# Patient Record
Sex: FEMALE | Race: BLACK OR AFRICAN AMERICAN | NOT HISPANIC OR LATINO | Employment: FULL TIME | URBAN - METROPOLITAN AREA
[De-identification: names, ages, dates, MRNs, and addresses within clinical notes are randomized per-mention and may not be internally consistent; named-entity substitution may affect disease eponyms.]

---

## 2023-04-27 DIAGNOSIS — K29.70 GASTRITIS, UNSPECIFIED, WITHOUT BLEEDING: ICD-10-CM

## 2023-04-28 RX ORDER — OMEPRAZOLE 20 MG/1
CAPSULE, DELAYED RELEASE ORAL
Qty: 90 CAPSULE | Refills: 1 | Status: SHIPPED | OUTPATIENT
Start: 2023-04-28 | End: 2024-01-12 | Stop reason: ENTERED-IN-ERROR

## 2023-07-07 ENCOUNTER — OFFICE VISIT (OUTPATIENT)
Dept: PRIMARY CARE | Facility: CLINIC | Age: 43
End: 2023-07-07
Payer: COMMERCIAL

## 2023-07-07 VITALS
DIASTOLIC BLOOD PRESSURE: 70 MMHG | WEIGHT: 158.6 LBS | BODY MASS INDEX: 29.01 KG/M2 | SYSTOLIC BLOOD PRESSURE: 120 MMHG | HEART RATE: 59 BPM | OXYGEN SATURATION: 100 %

## 2023-07-07 DIAGNOSIS — E78.5 HYPERLIPIDEMIA, UNSPECIFIED HYPERLIPIDEMIA TYPE: ICD-10-CM

## 2023-07-07 DIAGNOSIS — F41.1 GENERALIZED ANXIETY DISORDER: Primary | ICD-10-CM

## 2023-07-07 DIAGNOSIS — Z13.1 SCREENING FOR DIABETES MELLITUS: ICD-10-CM

## 2023-07-07 PROCEDURE — 99213 OFFICE O/P EST LOW 20 MIN: CPT | Performed by: INTERNAL MEDICINE

## 2023-07-07 PROCEDURE — 1036F TOBACCO NON-USER: CPT | Performed by: INTERNAL MEDICINE

## 2023-07-07 RX ORDER — MULTIVITAMIN
1 TABLET ORAL DAILY
COMMUNITY

## 2023-07-07 RX ORDER — ATORVASTATIN CALCIUM 40 MG/1
40 TABLET, FILM COATED ORAL DAILY
COMMUNITY
End: 2023-07-07 | Stop reason: SDUPTHER

## 2023-07-07 RX ORDER — FERROUS SULFATE 325(65) MG
325 TABLET ORAL
COMMUNITY
Start: 2018-04-09

## 2023-07-07 RX ORDER — ATORVASTATIN CALCIUM 40 MG/1
40 TABLET, FILM COATED ORAL DAILY
Qty: 90 TABLET | Refills: 2 | Status: SHIPPED | OUTPATIENT
Start: 2023-07-07 | End: 2024-01-12

## 2023-07-07 RX ORDER — BUSPIRONE HYDROCHLORIDE 10 MG/1
10 TABLET ORAL 2 TIMES DAILY
COMMUNITY
Start: 2021-11-02 | End: 2023-07-07 | Stop reason: SDUPTHER

## 2023-07-07 RX ORDER — ASCORBIC ACID 500 MG
500 TABLET,CHEWABLE ORAL DAILY
COMMUNITY

## 2023-07-07 RX ORDER — BUSPIRONE HYDROCHLORIDE 10 MG/1
10 TABLET ORAL 3 TIMES DAILY
Qty: 270 TABLET | Refills: 1 | Status: SHIPPED | OUTPATIENT
Start: 2023-07-07 | End: 2024-01-24 | Stop reason: SDUPTHER

## 2023-07-07 ASSESSMENT — ENCOUNTER SYMPTOMS
MUSCLE TENSION: 0
PALPITATIONS: 0
NERVOUS/ANXIOUS: 1
SHORTNESS OF BREATH: 0
PANIC: 1
NAUSEA: 0

## 2023-07-07 ASSESSMENT — PATIENT HEALTH QUESTIONNAIRE - PHQ9
SUM OF ALL RESPONSES TO PHQ9 QUESTIONS 1 AND 2: 0
2. FEELING DOWN, DEPRESSED OR HOPELESS: NOT AT ALL
1. LITTLE INTEREST OR PLEASURE IN DOING THINGS: NOT AT ALL

## 2023-07-07 NOTE — PROGRESS NOTES
Patient ID: Dennys Sahu is a 42 y.o. female who presents for Forms/questionnaires (Fmla forms) and Med Refill.    /70   Pulse 59   Wt 71.9 kg (158 lb 9.6 oz)   LMP  (LMP Unknown)   SpO2 100%   BMI 29.01 kg/m²     Med Refill  Pertinent negatives include no nausea.   Anxiety  Presents for follow-up visit. Symptoms include excessive worry, nervous/anxious behavior and panic. Patient reports no muscle tension, nausea, palpitations or shortness of breath. The quality of sleep is poor. Nighttime awakenings: several.     Compliance with medications is 51-75%.       Subjective     Review of Systems   Respiratory:  Negative for shortness of breath.    Cardiovascular:  Negative for palpitations.   Gastrointestinal:  Negative for nausea.   Psychiatric/Behavioral:  The patient is nervous/anxious.        Objective     Physical Exam  Vitals and nursing note reviewed.   Neck:      Vascular: No carotid bruit.   Musculoskeletal:      Right lower leg: No edema.      Left lower leg: No edema.   Lymphadenopathy:      Cervical: No cervical adenopathy.   Psychiatric:         Mood and Affect: Mood normal.         Behavior: Behavior normal.         Thought Content: Thought content normal.         Judgment: Judgment normal.         Lab Results   Component Value Date    WBC 6.4 11/10/2022    HGB 12.1 11/10/2022    HCT 38.5 11/10/2022    MCV 93 11/10/2022     11/10/2022           Problem List Items Addressed This Visit    None  Visit Diagnoses       Generalized anxiety disorder    -  Primary    Relevant Medications    busPIRone (Buspar) 10 mg tablet    Hyperlipidemia, unspecified hyperlipidemia type        Relevant Medications    atorvastatin (Lipitor) 40 mg tablet    Other Relevant Orders    Lipid panel    Screening for diabetes mellitus        Relevant Orders    Comprehensive metabolic panel                 A/P        BUSPAR 10MG 1 PILL THREE TIMES A DAY FILLED  ATORVASTATIN 40MG 1 PILL EVERY DAY FILLED  CMP,LIPID    FMLA FORM WILL BE COMPLETED WHEN THIS WILL BE AVAILABLE

## 2023-10-26 ENCOUNTER — TELEMEDICINE (OUTPATIENT)
Dept: PRIMARY CARE | Facility: CLINIC | Age: 43
End: 2023-10-26
Payer: COMMERCIAL

## 2023-10-26 DIAGNOSIS — U07.1 COVID-19 VIRUS INFECTION: Primary | ICD-10-CM

## 2023-10-26 PROCEDURE — 99212 OFFICE O/P EST SF 10 MIN: CPT | Performed by: INTERNAL MEDICINE

## 2023-10-29 PROBLEM — U07.1 COVID-19 VIRUS INFECTION: Status: ACTIVE | Noted: 2023-10-29

## 2023-10-30 NOTE — PROGRESS NOTES
Subjective   Patient ID: Dennys Sahu is a 42 y.o. female who presents for No chief complaint on file..    HPI       Telehealth visit with the patient to fill out her FMLA form patient recently had COVID-19 infection and not being able to work for 5 days she is feeling better and return to work on 10/26/2023      Review of Systems:  Constitutional: No fever or chills  Cardiovascular: no chest pain, no palpitations and no syncope.   Respiratory: no cough, no shortness of breath during exertion and no shortness of breath at rest.   Gastrointestinal: no abdominal pain, no nausea and no vomiting.  Neuro: No Headache, no dizziness    Physical Exam:   Constitutional: Alert and in no acute distress. Well developed, well nourished.   Head and Face: Head and face: Normal.     Eyes: Normal external exam.    Ears, Nose, Mouth, and Throat: External inspection of ears and nose: Normal.  Hearing: Normal.   Neck: No neck mass was observed. Supple.  Pulmonary: No respiratory distress.    Musculoskeletal: Range of motion: Normal.     Skin: Normal skin color and pigmentation, normal skin turgor, and no rash.    Neurologic: Coordination: Normal.    Psychiatric: Judgment and insight: Intact. Mood and affect: Normal.    Lab Results   Component Value Date    WBC 6.4 11/10/2022    HGB 12.1 11/10/2022    HCT 38.5 11/10/2022     11/10/2022    CHOL 171 11/10/2022    TRIG 87 11/10/2022    HDL 53.8 11/10/2022    ALT 13 09/10/2021    AST 15 09/10/2021     09/10/2021    K 3.8 09/10/2021     09/10/2021    CREATININE 0.55 09/10/2021    BUN 10 09/10/2021    CO2 25 09/10/2021    TSH 0.63 09/10/2021       Electrocardiogram 12 Lead  Please see ED Provider Note for formal interpretation  Confirmed by JOSIANE PINEDA PA-C ([id number]) on 9/11/2021 2:31:13 AM      Assessment/Plan   Diagnoses and all orders for this visit:  COVID-19 virus infection  Comments:  resolved    1.     FMLA form completed  Patient return to work on 10/26/2023    This  Medical recommendation has been made based on the Telephonic/Video conversation and the history is given by the patient, which I as a Physician and the patient also understand that there are limitations given the lack of an in-person Physical Exam. Patient will call back if symptoms don't improve.    Your yearly Physical is due in:   When you call the office for your yearly Physical, please ask them to inform me to order your blood work, so that you can get the fasting blood work before your appointment and we can discuss the results at your physical.      A Doctor is always available by phone when the office is closed. Please feel free to call for help with any problem that you feel shouldn't wait until the office re-opens.     Ryan Ventura MD

## 2023-11-22 ENCOUNTER — LAB (OUTPATIENT)
Dept: LAB | Facility: LAB | Age: 43
End: 2023-11-22
Payer: COMMERCIAL

## 2023-11-22 DIAGNOSIS — Z13.1 SCREENING FOR DIABETES MELLITUS: ICD-10-CM

## 2023-11-22 DIAGNOSIS — E78.5 HYPERLIPIDEMIA, UNSPECIFIED HYPERLIPIDEMIA TYPE: ICD-10-CM

## 2023-11-22 LAB
ALBUMIN SERPL BCP-MCNC: 4.2 G/DL (ref 3.4–5)
ALP SERPL-CCNC: 40 U/L (ref 33–110)
ALT SERPL W P-5'-P-CCNC: 16 U/L (ref 7–45)
ANION GAP SERPL CALC-SCNC: 14 MMOL/L (ref 10–20)
AST SERPL W P-5'-P-CCNC: 14 U/L (ref 9–39)
BILIRUB SERPL-MCNC: 0.7 MG/DL (ref 0–1.2)
BUN SERPL-MCNC: 9 MG/DL (ref 6–23)
CALCIUM SERPL-MCNC: 9.2 MG/DL (ref 8.6–10.6)
CHLORIDE SERPL-SCNC: 107 MMOL/L (ref 98–107)
CHOLEST SERPL-MCNC: 178 MG/DL (ref 0–199)
CHOLESTEROL/HDL RATIO: 3.6
CO2 SERPL-SCNC: 23 MMOL/L (ref 21–32)
CREAT SERPL-MCNC: 0.48 MG/DL (ref 0.5–1.05)
GFR SERPL CREATININE-BSD FRML MDRD: >90 ML/MIN/1.73M*2
GLUCOSE SERPL-MCNC: 103 MG/DL (ref 74–99)
HDLC SERPL-MCNC: 50 MG/DL
LDLC SERPL CALC-MCNC: 109 MG/DL
NON HDL CHOLESTEROL: 128 MG/DL (ref 0–149)
POTASSIUM SERPL-SCNC: 3.8 MMOL/L (ref 3.5–5.3)
PROT SERPL-MCNC: 7.2 G/DL (ref 6.4–8.2)
SODIUM SERPL-SCNC: 140 MMOL/L (ref 136–145)
TRIGL SERPL-MCNC: 93 MG/DL (ref 0–149)
VLDL: 19 MG/DL (ref 0–40)

## 2023-11-22 PROCEDURE — 80061 LIPID PANEL: CPT

## 2023-11-22 PROCEDURE — 36415 COLL VENOUS BLD VENIPUNCTURE: CPT

## 2023-11-22 PROCEDURE — 80053 COMPREHEN METABOLIC PANEL: CPT

## 2023-11-27 ENCOUNTER — TELEPHONE (OUTPATIENT)
Dept: PRIMARY CARE | Facility: CLINIC | Age: 43
End: 2023-11-27
Payer: COMMERCIAL

## 2023-11-27 DIAGNOSIS — E78.5 HYPERLIPIDEMIA, UNSPECIFIED HYPERLIPIDEMIA TYPE: Primary | ICD-10-CM

## 2023-11-27 NOTE — TELEPHONE ENCOUNTER
Pt. Is very anxious to receive her blood test results. She feels she cannot wait for Dr. Ventura to return . Can you please assist in Dr. Ventura's absence? Thank you

## 2023-11-27 NOTE — TELEPHONE ENCOUNTER
Pt. States that she has received the results from another doctor (she works here in [facility]). Is now requesting a detailed results review from Dr. Ventura. Forwarding to Dr. Ventura.

## 2023-11-29 NOTE — TELEPHONE ENCOUNTER
Spoke with patient and advised of message   Pt. States that she is hoping to come off of her cholesterol medication. Asking if she could come off of it now, or could she do a cholesterol test in 6 months and see if she could come off of it then. If you say she can come off of it, she wants to know if she should wean herself off. States one time she was off of it for 2 days and got muscle cramps.

## 2024-01-07 ENCOUNTER — CLINICAL SUPPORT (OUTPATIENT)
Dept: EMERGENCY MEDICINE | Facility: HOSPITAL | Age: 44
End: 2024-01-07
Payer: COMMERCIAL

## 2024-01-07 ENCOUNTER — HOSPITAL ENCOUNTER (EMERGENCY)
Facility: HOSPITAL | Age: 44
Discharge: HOME | End: 2024-01-07
Payer: COMMERCIAL

## 2024-01-07 ENCOUNTER — APPOINTMENT (OUTPATIENT)
Dept: RADIOLOGY | Facility: HOSPITAL | Age: 44
End: 2024-01-07
Payer: COMMERCIAL

## 2024-01-07 VITALS
HEIGHT: 62 IN | DIASTOLIC BLOOD PRESSURE: 70 MMHG | SYSTOLIC BLOOD PRESSURE: 111 MMHG | OXYGEN SATURATION: 98 % | TEMPERATURE: 97.9 F | BODY MASS INDEX: 27.6 KG/M2 | RESPIRATION RATE: 16 BRPM | HEART RATE: 82 BPM | WEIGHT: 150 LBS

## 2024-01-07 DIAGNOSIS — F41.0 PANIC ATTACK: Primary | ICD-10-CM

## 2024-01-07 LAB
ANION GAP SERPL CALC-SCNC: 11 MMOL/L (ref 10–20)
BASOPHILS # BLD MANUAL: 0.08 X10*3/UL (ref 0–0.1)
BASOPHILS NFR BLD MANUAL: 0.9 %
BUN SERPL-MCNC: 13 MG/DL (ref 6–23)
CALCIUM SERPL-MCNC: 9.7 MG/DL (ref 8.6–10.6)
CARDIAC TROPONIN I PNL SERPL HS: 3 NG/L (ref 0–34)
CHLORIDE SERPL-SCNC: 106 MMOL/L (ref 98–107)
CO2 SERPL-SCNC: 22 MMOL/L (ref 21–32)
CREAT SERPL-MCNC: 0.51 MG/DL (ref 0.5–1.05)
EOSINOPHIL # BLD MANUAL: 0.08 X10*3/UL (ref 0–0.7)
EOSINOPHIL NFR BLD MANUAL: 0.9 %
ERYTHROCYTE [DISTWIDTH] IN BLOOD BY AUTOMATED COUNT: 12 % (ref 11.5–14.5)
GFR SERPL CREATININE-BSD FRML MDRD: >90 ML/MIN/1.73M*2
GLUCOSE SERPL-MCNC: 137 MG/DL (ref 74–99)
HCT VFR BLD AUTO: 33.3 % (ref 36–46)
HGB BLD-MCNC: 11 G/DL (ref 12–16)
IMM GRANULOCYTES # BLD AUTO: 0.02 X10*3/UL (ref 0–0.7)
IMM GRANULOCYTES NFR BLD AUTO: 0.2 % (ref 0–0.9)
LYMPHOCYTES # BLD MANUAL: 2.78 X10*3/UL (ref 1.2–4.8)
LYMPHOCYTES NFR BLD MANUAL: 31.9 %
MCH RBC QN AUTO: 28.4 PG (ref 26–34)
MCHC RBC AUTO-ENTMCNC: 33 G/DL (ref 32–36)
MCV RBC AUTO: 86 FL (ref 80–100)
MONOCYTES # BLD MANUAL: 0.67 X10*3/UL (ref 0.1–1)
MONOCYTES NFR BLD MANUAL: 7.7 %
NEUTS SEG # BLD MANUAL: 5.1 X10*3/UL (ref 1.2–7)
NEUTS SEG NFR BLD MANUAL: 58.6 %
NRBC BLD-RTO: 0 /100 WBCS (ref 0–0)
PLATELET # BLD AUTO: 227 X10*3/UL (ref 150–450)
POTASSIUM SERPL-SCNC: 3.4 MMOL/L (ref 3.5–5.3)
PREGNANCY TEST URINE, POC: NEGATIVE
RBC # BLD AUTO: 3.87 X10*6/UL (ref 4–5.2)
RBC MORPH BLD: NORMAL
SODIUM SERPL-SCNC: 136 MMOL/L (ref 136–145)
TOTAL CELLS COUNTED BLD: 116
WBC # BLD AUTO: 8.7 X10*3/UL (ref 4.4–11.3)

## 2024-01-07 PROCEDURE — 71046 X-RAY EXAM CHEST 2 VIEWS: CPT | Performed by: RADIOLOGY

## 2024-01-07 PROCEDURE — 81025 URINE PREGNANCY TEST: CPT

## 2024-01-07 PROCEDURE — 2500000004 HC RX 250 GENERAL PHARMACY W/ HCPCS (ALT 636 FOR OP/ED): Mod: SE

## 2024-01-07 PROCEDURE — 99285 EMERGENCY DEPT VISIT HI MDM: CPT | Performed by: EMERGENCY MEDICINE

## 2024-01-07 PROCEDURE — 99284 EMERGENCY DEPT VISIT MOD MDM: CPT

## 2024-01-07 PROCEDURE — 85007 BL SMEAR W/DIFF WBC COUNT: CPT

## 2024-01-07 PROCEDURE — 71046 X-RAY EXAM CHEST 2 VIEWS: CPT

## 2024-01-07 PROCEDURE — 85027 COMPLETE CBC AUTOMATED: CPT

## 2024-01-07 PROCEDURE — 93005 ELECTROCARDIOGRAM TRACING: CPT

## 2024-01-07 PROCEDURE — 99283 EMERGENCY DEPT VISIT LOW MDM: CPT

## 2024-01-07 PROCEDURE — 84484 ASSAY OF TROPONIN QUANT: CPT

## 2024-01-07 PROCEDURE — 36415 COLL VENOUS BLD VENIPUNCTURE: CPT

## 2024-01-07 PROCEDURE — 80048 BASIC METABOLIC PNL TOTAL CA: CPT

## 2024-01-07 RX ORDER — POTASSIUM CHLORIDE 20 MEQ/1
20 TABLET, EXTENDED RELEASE ORAL ONCE
Status: COMPLETED | OUTPATIENT
Start: 2024-01-07 | End: 2024-01-07

## 2024-01-07 RX ORDER — HYDROXYZINE HYDROCHLORIDE 25 MG/1
25 TABLET, FILM COATED ORAL EVERY 6 HOURS PRN
Qty: 12 TABLET | Refills: 0 | Status: SHIPPED | OUTPATIENT
Start: 2024-01-07 | End: 2024-01-12

## 2024-01-07 RX ADMIN — POTASSIUM CHLORIDE 20 MEQ: 1500 TABLET, EXTENDED RELEASE ORAL at 02:18

## 2024-01-07 ASSESSMENT — LIFESTYLE VARIABLES
REASON UNABLE TO ASSESS: NO
HAVE PEOPLE ANNOYED YOU BY CRITICIZING YOUR DRINKING: NO
HAVE YOU EVER FELT YOU SHOULD CUT DOWN ON YOUR DRINKING: NO
EVER FELT BAD OR GUILTY ABOUT YOUR DRINKING: NO
EVER HAD A DRINK FIRST THING IN THE MORNING TO STEADY YOUR NERVES TO GET RID OF A HANGOVER: NO

## 2024-01-07 ASSESSMENT — COLUMBIA-SUICIDE SEVERITY RATING SCALE - C-SSRS
6. HAVE YOU EVER DONE ANYTHING, STARTED TO DO ANYTHING, OR PREPARED TO DO ANYTHING TO END YOUR LIFE?: NO
2. HAVE YOU ACTUALLY HAD ANY THOUGHTS OF KILLING YOURSELF?: NO
1. IN THE PAST MONTH, HAVE YOU WISHED YOU WERE DEAD OR WISHED YOU COULD GO TO SLEEP AND NOT WAKE UP?: NO

## 2024-01-07 ASSESSMENT — PAIN - FUNCTIONAL ASSESSMENT
PAIN_FUNCTIONAL_ASSESSMENT: 0-10
PAIN_FUNCTIONAL_ASSESSMENT: 0-10

## 2024-01-07 ASSESSMENT — PAIN DESCRIPTION - PROGRESSION: CLINICAL_PROGRESSION: NOT CHANGED

## 2024-01-07 ASSESSMENT — PAIN SCALES - GENERAL
PAINLEVEL_OUTOF10: 0 - NO PAIN

## 2024-01-07 NOTE — ED PROVIDER NOTES
"HPI   No chief complaint on file.      HPI     Patient is a 43-year-old female with past medical history significant for anxiety, panic attacks (follows with Dr. Bo), HLD presenting to the emergency department for palpitations. Earlier this evening, as patient was lying down to go to bed, she felt a \"nervous wave\" come over her body and her heart started to race. She got up and went to the bathroom and when symptoms did not subside, she called 911. Patient currently takes buspirone for her anxiety. There is now any 1 specific aspect or event that happened today to cause her any sort of anxiety. During this time, patient felt short of breath and that her heart was racing that, but did not feel that she had chest pain. Currently, patient's symptoms are much improved and she does not feel that her heart is racing anymore, but still endorses some chest tightness. She did not take any medications at home once symptoms began. No syncope. Patient does have a family history of heart disease stating that her mother passed away from a cardiac related death. Of note, patient states that she was recently taken off her statin. She denies cough, leg swelling, leg pain, nausea or vomiting.               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    Esophageal spasm    Encounter for screening for diabetes mellitus 08/27/2021    Screening for diabetes mellitus (DM)    Encounter for screening for diseases of the blood and blood-forming organs and certain disorders involving the immune mechanism " 08/27/2021    Screening, anemia, deficiency, iron    Encounter for screening for lipoid disorders 05/23/2022    Screening for hyperlipidemia    Encounter for screening for other suspected endocrine disorder 08/27/2021    Screening for thyroid disorder    Eructation 05/23/2022    Burping    Other cyst of bone, unspecified ankle and foot 12/06/2021    Bone cyst of foot    Other specified health status     No pertinent past medical history    Other specified postprocedural states 10/12/2021    History of Papanicolaou smear    Other specified soft tissue disorders 12/08/2022    Soft tissue mass    Other symptoms and signs concerning food and fluid intake 12/19/2022    Dehydration symptoms    Pain in right foot 12/02/2021    Foot pain, bilateral    Pain in right leg 06/09/2022    Pain of right lower extremity    Personal history of other benign neoplasm 02/22/2022    History of other benign neoplasm    Personal history of other benign neoplasm 02/22/2022    History of other benign neoplasm    Personal history of other diseases of the digestive system 12/19/2022    History of gastroesophageal reflux (GERD)    Personal history of other diseases of the digestive system 09/10/2021    History of gastritis    Personal history of other endocrine, nutritional and metabolic disease 02/12/2022    History of goiter    Personal history of other medical treatment 12/19/2022    History of screening mammography    Personal history of other specified conditions 10/11/2021    History of abnormal mammogram    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  Social History     Tobacco Use    Smoking status: Never    Smokeless tobacco: Never   Substance Use Topics    Alcohol use: Not on file    Drug use: Not on file       Physical Exam   ED Triage Vitals   Temp Pulse Resp BP   -- -- -- --       SpO2 Temp src Heart Rate Source Patient Position   -- -- -- --      BP Location FiO2 (%)     -- --       Physical Exam  Vitals and nursing note reviewed.   HENT:      Head: Normocephalic and atraumatic.      Nose: No rhinorrhea.      Mouth/Throat:      Mouth: Mucous membranes are moist.      Pharynx: Oropharynx is clear. No oropharyngeal exudate or posterior oropharyngeal erythema.   Eyes:      General: No scleral icterus.     Extraocular Movements: Extraocular movements intact.      Conjunctiva/sclera: Conjunctivae normal.      Pupils: Pupils are equal, round, and reactive to light.   Cardiovascular:      Rate and Rhythm: Normal rate and regular rhythm.      Pulses: Normal pulses.      Heart sounds: Normal heart sounds. No murmur heard.  Pulmonary:      Effort: Pulmonary effort is normal.      Breath sounds: Normal breath sounds. No wheezing, rhonchi or rales.   Abdominal:      Palpations: Abdomen is soft. There is no mass.      Tenderness: There is no abdominal tenderness. There is no guarding or rebound.   Musculoskeletal:         General: No swelling, tenderness, deformity or signs of injury. Normal range of motion.      Cervical back: Normal range of motion and neck supple.   Skin:     General: Skin is warm and dry.      Capillary Refill: Capillary refill takes less than 2 seconds.      Coloration: Skin is not jaundiced.      Findings: No rash.   Neurological:      General: No focal deficit present.      Mental Status: She is alert and oriented to person, place, and time.      Cranial Nerves: No cranial nerve deficit.      Sensory: No sensory deficit.      Motor: No weakness.   Psychiatric:         Mood and Affect: Mood normal.         Behavior: Behavior normal.         Thought Content: Thought content normal.       ED Course & MDM   Diagnoses as of 01/07/24 0145   Panic attack       Medical Decision Making  Patient is a 43-year-old female with past medical history significant for anxiety, panic attacks  (follows with Dr. Bo), HLD presenting to the emergency department for palpitations. On initial presentation, patient's symptoms are much improved and she is not complaining of chest pain or shortness of breath at this moment. Vitals are stable without tachycardia or hypotension. Saturating 100% on room air. Initial workup ordered included EKG, chest x-ray, BMP, CBC, pregnancy test and serial troponins. Differential includes panic attack, arrhythmia, ACS, PE, pneumonia. While obtaining initial EKG, patient's heart rate elevated to 130s in what appeared to be sinus rhythm, but was not captured by EKG before decreasing back to 90s. X-ray obtained shows no evidence of acute cardiopulmonary processes. Troponin within reference range. Potassium 3.4 so oral potassium administered. Given patient's feelings of anxiety, nervousness with a rapid heart rate followed by negative chest pain workup in the emergency department, this would be consistent with anxiety and panic attack. Patient was given prescription for 25 mg hydroxyzine for panic attack . Referred to primary care provider to consider adding on another preventative medication such as a beta-blocker. As a result of the work-up, patient was discharged home.  They were informed of their diagnosis and instructed to come back with any concerns or worsening of condition and was agreeable to the plan as discussed above.  The patient was given the opportunity to ask questions.  All of the patient's questions were answered.  The patient remained stable under my care.      EKG Interpretation:   Rate: 92 BPM  Rhythm: Normal sinus rhythm  Axis: normal  Intervals/waves: Regular [place] interval with narrow QRS complex  ST changes: None appreciated  Procedure  Procedures     Yomi Urbano MD  Resident  [phone]     no

## 2024-01-07 NOTE — ED TRIAGE NOTES
Patient states that she was laying down and she started to feel her heart racing. She states that's she became short of breath and started to have some chest discomfort

## 2024-01-07 NOTE — DISCHARGE INSTRUCTIONS
Today you were seen for chest palpitations and shortness of breath. After further investigation with chest x-ray and labs, there is a low likelihood that you experienced a true arrhythmia and that symptoms are most likely related to that of a panic attack or anxiety exacerbation. We are sending you home with a prescription for hydroxyzine. Please take 1 tablet as needed for worsening symptoms. If symptoms do not quickly resolve, you experience significant chest pain or shortness of breath that feels different than your anxiety, please return to the emergency department.

## 2024-01-08 ENCOUNTER — CLINICAL SUPPORT (OUTPATIENT)
Dept: EMERGENCY MEDICINE | Facility: HOSPITAL | Age: 44
End: 2024-01-08
Payer: COMMERCIAL

## 2024-01-08 LAB
ATRIAL RATE: 92 BPM
P AXIS: 59 DEGREES
P OFFSET: 193 MS
P ONSET: 140 MS
PR INTERVAL: 172 MS
Q ONSET: 226 MS
QRS COUNT: 15 BEATS
QRS DURATION: 74 MS
QT INTERVAL: 276 MS
QTC CALCULATION(BAZETT): 341 MS
QTC FREDERICIA: 318 MS
R AXIS: 29 DEGREES
T AXIS: -28 DEGREES
T OFFSET: 364 MS
VENTRICULAR RATE: 92 BPM

## 2024-01-12 ENCOUNTER — OFFICE VISIT (OUTPATIENT)
Dept: PRIMARY CARE | Facility: CLINIC | Age: 44
End: 2024-01-12
Payer: COMMERCIAL

## 2024-01-12 VITALS
BODY MASS INDEX: 29.08 KG/M2 | DIASTOLIC BLOOD PRESSURE: 87 MMHG | WEIGHT: 159 LBS | HEART RATE: 75 BPM | OXYGEN SATURATION: 99 % | SYSTOLIC BLOOD PRESSURE: 135 MMHG

## 2024-01-12 DIAGNOSIS — F41.1 GENERALIZED ANXIETY DISORDER: Primary | ICD-10-CM

## 2024-01-12 PROBLEM — E21.5 DISORDER OF PARATHYROID GLAND (MULTI): Status: ACTIVE | Noted: 2024-01-12

## 2024-01-12 PROBLEM — F41.0 PANIC ATTACKS: Status: ACTIVE | Noted: 2024-01-12

## 2024-01-12 PROCEDURE — 1036F TOBACCO NON-USER: CPT | Performed by: INTERNAL MEDICINE

## 2024-01-12 PROCEDURE — 99213 OFFICE O/P EST LOW 20 MIN: CPT | Performed by: INTERNAL MEDICINE

## 2024-01-12 RX ORDER — PROPRANOLOL HYDROCHLORIDE 10 MG/1
10 TABLET ORAL 3 TIMES DAILY
Qty: 90 TABLET | Refills: 1 | Status: SHIPPED | OUTPATIENT
Start: 2024-01-12 | End: 2025-01-11

## 2024-01-12 ASSESSMENT — PATIENT HEALTH QUESTIONNAIRE - PHQ9
1. LITTLE INTEREST OR PLEASURE IN DOING THINGS: NOT AT ALL
SUM OF ALL RESPONSES TO PHQ9 QUESTIONS 1 AND 2: 0
2. FEELING DOWN, DEPRESSED OR HOPELESS: NOT AT ALL
1. LITTLE INTEREST OR PLEASURE IN DOING THINGS: NOT AT ALL
SUM OF ALL RESPONSES TO PHQ9 QUESTIONS 1 AND 2: 0
2. FEELING DOWN, DEPRESSED OR HOPELESS: NOT AT ALL

## 2024-01-12 ASSESSMENT — ENCOUNTER SYMPTOMS: CONSTITUTIONAL NEGATIVE: 1

## 2024-01-12 NOTE — PROGRESS NOTES
Patient ID: Dennys Sahu is a 43 y.o. female who presents for Follow-up (To ER for panic attack) and fmla .    /87   Pulse 75   Wt 72.1 kg (159 lb)   LMP 12/15/2023 (Approximate)   SpO2 99%   BMI 29.08 kg/m²     HPI      Patient is follow-up from the emergency visit  She had an episode of panic attack on 1/7/2022  With rapid heartbeat, as if she could not even breathe properly, she would afraid with that rapid heartbeat that makes her so panicking about it  That she is going to die she called 911 they took her to [facility] emergency, she has been checked out, she has typical panic attacks with anxiety patient has been taking BuSpar            Subjective     Review of Systems   Constitutional: Negative.    All other systems reviewed and are negative.      Objective     Physical Exam  Vitals and nursing note reviewed.   Cardiovascular:      Rate and Rhythm: Normal rate and regular rhythm.      Pulses: Normal pulses.      Heart sounds: Normal heart sounds. No murmur heard.  Pulmonary:      Effort: Pulmonary effort is normal.      Breath sounds: Normal breath sounds.   Musculoskeletal:      Right lower leg: No edema.      Left lower leg: No edema.   Psychiatric:         Mood and Affect: Mood normal.         Behavior: Behavior normal.         Thought Content: Thought content normal.         Judgment: Judgment normal.      Comments: Patient seems very anxious and apprehensive         Lab Results   Component Value Date    WBC 8.7 01/07/2024    HGB 11.0 (L) 01/07/2024    HCT 33.3 (L) 01/07/2024    MCV 86 01/07/2024     01/07/2024           Problem List Items Addressed This Visit    None  Visit Diagnoses       Generalized anxiety disorder    -  Primary    Relevant Medications    propranolol (Inderal) 10 mg tablet                   A/P         Will start her on low-dose of propranolol 10 mg 1 pill 3 times a day  Continue the BuSpar as needed  Follow-up with me in 3 months time  FMLA form  completed

## 2024-01-24 ENCOUNTER — TELEPHONE (OUTPATIENT)
Dept: PRIMARY CARE | Facility: CLINIC | Age: 44
End: 2024-01-24
Payer: COMMERCIAL

## 2024-01-24 DIAGNOSIS — F41.1 GENERALIZED ANXIETY DISORDER: ICD-10-CM

## 2024-01-24 RX ORDER — BUSPIRONE HYDROCHLORIDE 10 MG/1
10 TABLET ORAL 3 TIMES DAILY
Qty: 90 TABLET | Refills: 1 | Status: SHIPPED | OUTPATIENT
Start: 2024-01-24 | End: 2024-02-23

## 2024-01-24 NOTE — TELEPHONE ENCOUNTER
Pt. Requesting refill of Buspar, and would also like a rx for an iron pill so she doesn't have to pay for otc. Pharmacy on file.

## 2024-01-25 ENCOUNTER — TELEPHONE (OUTPATIENT)
Dept: PRIMARY CARE | Facility: CLINIC | Age: 44
End: 2024-01-25
Payer: COMMERCIAL

## 2024-01-25 DIAGNOSIS — E78.5 HYPERLIPIDEMIA, UNSPECIFIED HYPERLIPIDEMIA TYPE: Primary | ICD-10-CM

## 2024-01-25 NOTE — TELEPHONE ENCOUNTER
Pt. Sent e-mail stating she is having palpitations after exertion. Per Dr. Ventura, she is to go to ER. Pt. Informed. Does not feel it is urgent enough at this time to go to ER. She will call back to schedule an appt. For next week.

## 2024-01-29 ENCOUNTER — LAB (OUTPATIENT)
Dept: LAB | Facility: LAB | Age: 44
End: 2024-01-29
Payer: COMMERCIAL

## 2024-01-29 DIAGNOSIS — E78.5 HYPERLIPIDEMIA, UNSPECIFIED HYPERLIPIDEMIA TYPE: ICD-10-CM

## 2024-01-29 PROBLEM — E21.5 DISORDER OF PARATHYROID GLAND (MULTI): Status: RESOLVED | Noted: 2024-01-12 | Resolved: 2024-01-29

## 2024-01-29 LAB
CHOLEST SERPL-MCNC: 237 MG/DL (ref 0–199)
CHOLESTEROL/HDL RATIO: 4.4
HDLC SERPL-MCNC: 54.3 MG/DL
LDLC SERPL CALC-MCNC: 155 MG/DL
NON HDL CHOLESTEROL: 183 MG/DL (ref 0–149)
TRIGL SERPL-MCNC: 139 MG/DL (ref 0–149)
VLDL: 28 MG/DL (ref 0–40)

## 2024-01-29 PROCEDURE — 36415 COLL VENOUS BLD VENIPUNCTURE: CPT

## 2024-01-29 PROCEDURE — 80061 LIPID PANEL: CPT

## 2024-01-30 ENCOUNTER — TELEPHONE (OUTPATIENT)
Dept: PRIMARY CARE | Facility: CLINIC | Age: 44
End: 2024-01-30
Payer: COMMERCIAL

## 2024-01-30 DIAGNOSIS — E78.5 HYPERLIPIDEMIA, UNSPECIFIED HYPERLIPIDEMIA TYPE: Primary | ICD-10-CM

## 2024-01-30 NOTE — TELEPHONE ENCOUNTER
Pt. States saw her cholesterol levels on line and is aware that they are high. States at this  point she is agreeable to going back on the cholesterol medication if that is what you feel she needs to do. States she can feel it in her heart when she is not on the cholesterol medication, even though she knows that it should not have an effect.

## 2024-01-31 RX ORDER — ATORVASTATIN CALCIUM 20 MG/1
20 TABLET, FILM COATED ORAL DAILY
Qty: 30 TABLET | Refills: 2 | Status: SHIPPED | OUTPATIENT
Start: 2024-01-31 | End: 2024-05-04

## 2024-01-31 NOTE — TELEPHONE ENCOUNTER
Spoke with patient and advised of message   It has been discontinued. Can you please send a refill?

## 2024-02-01 NOTE — TELEPHONE ENCOUNTER
Spoke with patient and advised of message   Pt. States that the Atorvastatin used to be 40mg. She wants to know if you really wanted to decrease it 20mg.

## 2024-02-22 DIAGNOSIS — F41.1 GENERALIZED ANXIETY DISORDER: ICD-10-CM

## 2024-02-23 RX ORDER — BUSPIRONE HYDROCHLORIDE 10 MG/1
10 TABLET ORAL 3 TIMES DAILY
Qty: 270 TABLET | Refills: 0 | Status: SHIPPED | OUTPATIENT
Start: 2024-02-23 | End: 2024-05-23

## 2024-05-01 DIAGNOSIS — E78.5 HYPERLIPIDEMIA, UNSPECIFIED HYPERLIPIDEMIA TYPE: ICD-10-CM

## 2024-05-04 RX ORDER — ATORVASTATIN CALCIUM 20 MG/1
20 TABLET, FILM COATED ORAL DAILY
Qty: 90 TABLET | Refills: 2 | Status: SHIPPED | OUTPATIENT
Start: 2024-05-04

## 2024-06-17 DIAGNOSIS — E78.5 HYPERLIPIDEMIA, UNSPECIFIED HYPERLIPIDEMIA TYPE: ICD-10-CM

## 2024-06-17 RX ORDER — ATORVASTATIN CALCIUM 20 MG/1
20 TABLET, FILM COATED ORAL DAILY
Qty: 90 TABLET | Refills: 1 | Status: SHIPPED | OUTPATIENT
Start: 2024-06-17

## 2024-06-20 DIAGNOSIS — F41.1 GENERALIZED ANXIETY DISORDER: ICD-10-CM

## 2024-06-20 RX ORDER — BUSPIRONE HYDROCHLORIDE 10 MG/1
10 TABLET ORAL 3 TIMES DAILY
Qty: 90 TABLET | Refills: 0 | Status: SHIPPED | OUTPATIENT
Start: 2024-06-20